# Patient Record
Sex: MALE | HISPANIC OR LATINO | Employment: FULL TIME | URBAN - METROPOLITAN AREA
[De-identification: names, ages, dates, MRNs, and addresses within clinical notes are randomized per-mention and may not be internally consistent; named-entity substitution may affect disease eponyms.]

---

## 2019-05-18 ENCOUNTER — APPOINTMENT (OUTPATIENT)
Dept: RADIOLOGY | Facility: MEDICAL CENTER | Age: 29
End: 2019-05-18
Attending: EMERGENCY MEDICINE
Payer: OTHER MISCELLANEOUS

## 2019-05-18 ENCOUNTER — HOSPITAL ENCOUNTER (EMERGENCY)
Facility: MEDICAL CENTER | Age: 29
End: 2019-05-19
Attending: EMERGENCY MEDICINE
Payer: OTHER MISCELLANEOUS

## 2019-05-18 DIAGNOSIS — N50.812 TESTICULAR PAIN, LEFT: ICD-10-CM

## 2019-05-18 DIAGNOSIS — R10.32 LEFT LOWER QUADRANT PAIN: ICD-10-CM

## 2019-05-18 DIAGNOSIS — N20.1 URETERAL STONE: ICD-10-CM

## 2019-05-18 LAB
ALBUMIN SERPL BCP-MCNC: 4.6 G/DL (ref 3.2–4.9)
ALBUMIN/GLOB SERPL: 1.6 G/DL
ALP SERPL-CCNC: 45 U/L (ref 30–99)
ALT SERPL-CCNC: 130 U/L (ref 2–50)
ANION GAP SERPL CALC-SCNC: 9 MMOL/L (ref 0–11.9)
APPEARANCE UR: CLEAR
AST SERPL-CCNC: 56 U/L (ref 12–45)
BACTERIA #/AREA URNS HPF: NEGATIVE /HPF
BASOPHILS # BLD AUTO: 0.9 % (ref 0–1.8)
BASOPHILS # BLD: 0.07 K/UL (ref 0–0.12)
BILIRUB SERPL-MCNC: 0.6 MG/DL (ref 0.1–1.5)
BILIRUB UR QL STRIP.AUTO: NEGATIVE
BUN SERPL-MCNC: 13 MG/DL (ref 8–22)
CALCIUM SERPL-MCNC: 9.3 MG/DL (ref 8.5–10.5)
CHLORIDE SERPL-SCNC: 105 MMOL/L (ref 96–112)
CO2 SERPL-SCNC: 24 MMOL/L (ref 20–33)
COLOR UR: YELLOW
CREAT SERPL-MCNC: 1.15 MG/DL (ref 0.5–1.4)
EOSINOPHIL # BLD AUTO: 0.26 K/UL (ref 0–0.51)
EOSINOPHIL NFR BLD: 3.3 % (ref 0–6.9)
EPI CELLS #/AREA URNS HPF: NEGATIVE /HPF
ERYTHROCYTE [DISTWIDTH] IN BLOOD BY AUTOMATED COUNT: 39.2 FL (ref 35.9–50)
GLOBULIN SER CALC-MCNC: 2.8 G/DL (ref 1.9–3.5)
GLUCOSE SERPL-MCNC: 106 MG/DL (ref 65–99)
GLUCOSE UR STRIP.AUTO-MCNC: NEGATIVE MG/DL
HCT VFR BLD AUTO: 51.8 % (ref 42–52)
HGB BLD-MCNC: 17.2 G/DL (ref 14–18)
HYALINE CASTS #/AREA URNS LPF: ABNORMAL /LPF
IMM GRANULOCYTES # BLD AUTO: 0.03 K/UL (ref 0–0.11)
IMM GRANULOCYTES NFR BLD AUTO: 0.4 % (ref 0–0.9)
KETONES UR STRIP.AUTO-MCNC: ABNORMAL MG/DL
LEUKOCYTE ESTERASE UR QL STRIP.AUTO: NEGATIVE
LIPASE SERPL-CCNC: 42 U/L (ref 11–82)
LYMPHOCYTES # BLD AUTO: 2.32 K/UL (ref 1–4.8)
LYMPHOCYTES NFR BLD: 29.8 % (ref 22–41)
MCH RBC QN AUTO: 29.3 PG (ref 27–33)
MCHC RBC AUTO-ENTMCNC: 33.2 G/DL (ref 33.7–35.3)
MCV RBC AUTO: 88.1 FL (ref 81.4–97.8)
MICRO URNS: ABNORMAL
MONOCYTES # BLD AUTO: 0.73 K/UL (ref 0–0.85)
MONOCYTES NFR BLD AUTO: 9.4 % (ref 0–13.4)
NEUTROPHILS # BLD AUTO: 4.38 K/UL (ref 1.82–7.42)
NEUTROPHILS NFR BLD: 56.2 % (ref 44–72)
NITRITE UR QL STRIP.AUTO: NEGATIVE
NRBC # BLD AUTO: 0 K/UL
NRBC BLD-RTO: 0 /100 WBC
PH UR STRIP.AUTO: 5 [PH]
PLATELET # BLD AUTO: 226 K/UL (ref 164–446)
PMV BLD AUTO: 11.6 FL (ref 9–12.9)
POTASSIUM SERPL-SCNC: 3.9 MMOL/L (ref 3.6–5.5)
PROT SERPL-MCNC: 7.4 G/DL (ref 6–8.2)
PROT UR QL STRIP: NEGATIVE MG/DL
RBC # BLD AUTO: 5.88 M/UL (ref 4.7–6.1)
RBC # URNS HPF: ABNORMAL /HPF
RBC UR QL AUTO: ABNORMAL
SODIUM SERPL-SCNC: 138 MMOL/L (ref 135–145)
SP GR UR STRIP.AUTO: 1.02
UROBILINOGEN UR STRIP.AUTO-MCNC: 0.2 MG/DL
WBC # BLD AUTO: 7.8 K/UL (ref 4.8–10.8)
WBC #/AREA URNS HPF: ABNORMAL /HPF

## 2019-05-18 PROCEDURE — 76870 US EXAM SCROTUM: CPT

## 2019-05-18 PROCEDURE — 81001 URINALYSIS AUTO W/SCOPE: CPT

## 2019-05-18 PROCEDURE — 80053 COMPREHEN METABOLIC PANEL: CPT

## 2019-05-18 PROCEDURE — 700102 HCHG RX REV CODE 250 W/ 637 OVERRIDE(OP): Performed by: EMERGENCY MEDICINE

## 2019-05-18 PROCEDURE — A9270 NON-COVERED ITEM OR SERVICE: HCPCS | Performed by: EMERGENCY MEDICINE

## 2019-05-18 PROCEDURE — 83690 ASSAY OF LIPASE: CPT

## 2019-05-18 PROCEDURE — 85025 COMPLETE CBC W/AUTO DIFF WBC: CPT

## 2019-05-18 PROCEDURE — 99284 EMERGENCY DEPT VISIT MOD MDM: CPT

## 2019-05-18 RX ORDER — IBUPROFEN 600 MG/1
600 TABLET ORAL ONCE
Status: COMPLETED | OUTPATIENT
Start: 2019-05-18 | End: 2019-05-18

## 2019-05-18 RX ADMIN — IBUPROFEN 600 MG: 600 TABLET ORAL at 22:24

## 2019-05-18 ASSESSMENT — PAIN DESCRIPTION - DESCRIPTORS
DESCRIPTORS: SHARP
DESCRIPTORS: SHARP

## 2019-05-19 ENCOUNTER — APPOINTMENT (OUTPATIENT)
Dept: RADIOLOGY | Facility: MEDICAL CENTER | Age: 29
End: 2019-05-19
Attending: EMERGENCY MEDICINE
Payer: OTHER MISCELLANEOUS

## 2019-05-19 VITALS
WEIGHT: 229.94 LBS | HEIGHT: 68 IN | TEMPERATURE: 98 F | HEART RATE: 94 BPM | RESPIRATION RATE: 18 BRPM | DIASTOLIC BLOOD PRESSURE: 72 MMHG | OXYGEN SATURATION: 91 % | SYSTOLIC BLOOD PRESSURE: 115 MMHG | BODY MASS INDEX: 34.85 KG/M2

## 2019-05-19 PROCEDURE — 74176 CT ABD & PELVIS W/O CONTRAST: CPT

## 2019-05-19 RX ORDER — HYDROCODONE BITARTRATE AND ACETAMINOPHEN 5; 325 MG/1; MG/1
1 TABLET ORAL EVERY 4 HOURS PRN
Qty: 9 TAB | Refills: 0 | Status: SHIPPED | OUTPATIENT
Start: 2019-05-19 | End: 2019-05-21

## 2019-05-19 RX ORDER — ONDANSETRON 4 MG/1
4 TABLET, FILM COATED ORAL EVERY 4 HOURS PRN
Qty: 10 TAB | Refills: 0 | Status: SHIPPED | OUTPATIENT
Start: 2019-05-19

## 2019-05-19 NOTE — DISCHARGE INSTRUCTIONS
Please follow-up with the urologist listed.  Call Monday morning schedule a follow-up appointment for complete recheck.  Please let the daughter know that you have a 4.6 mm proximal ureteral stone that may require follow-up.  Youmay take the medication as prescribed for pain.  Return to the emergency department if you develop new or worsening symptoms include worsening pain, fevers, pain with urination, worsening abdominal pain, or any further concerns.

## 2019-05-19 NOTE — ED PROVIDER NOTES
ED Provider Note    Chief Complaint:   Groin pain, abdominal pain    HPI:  Guerrero Elam is a 28 y.o. male who presents with chief complaint of groin pain and abdominal pain.  His symptoms began around 5:00 this afternoon.  Previously he was in his normal state of health.  He did take a short flight from [place] which he says often causes a little bit of nausea, otherwise he reports no nausea, no vomiting.  Around 5 PM he developed an acute onset severe pain localized to the left testicle.  It initially became more severe, then alleviated somewhat though did not resolve.  Pain then began to radiate to the lower abdomen, left more so than right.  He denies any associated fevers, no associated chest pain, he states he feels a little short of breath when the pain becomes very severe, but otherwise has no shortness of breath.  He denies any abnormal rashes or lesions, he has not noticed any testicular swelling.  Denies dysuria, denies hematuria.    He has a past medical history significant for headaches.  He has no history of diabetes, no impaired immunity, no endocrine disorder.    Review of Systems:  See HPI for pertinent positives and negatives. All other systems negative.    Past Medical History:   has a past medical history of Hypertension and Intractable chronic migraine without aura and without status migrainosus.    Social History:  Social History     Social History Main Topics   • Smoking status: Never Smoker   • Smokeless tobacco: Never Used   • Alcohol use 0.0 oz/week      Comment: rarely   • Drug use: No   • Sexual activity: Not on file       Surgical History:  patient denies any surgical history    Current Medications:  Home Medications    **Home medications have not yet been reviewed for this encounter**         Allergies:  Allergies   Allergen Reactions   • Latex    • Nkda [No Known Drug Allergy]        Physical Exam:  Vital Signs: /72   Pulse 94   Temp 36.7 °C (98 °F)   Resp 18    "Ht 1.727 m (5' 8\")   Wt 104.3 kg (229 lb 15 oz)   SpO2 91%   BMI 34.96 kg/m²   Constitutional: Alert, no acute distress  HENT: Moist mucus membranes, normal posterior pharynx, no intraoral lesions  Eyes: Pupils equal and reactive, normal conjunctiva  Neck: Supple, normal range of motion, no stridor  Cardiovascular: Extremities are warm and well perfused, no murmur appreciated, normal cardiac auscultation  Pulmonary: No respiratory distress, normal work of breathing, no accessory muscule usage, breath sounds clear and equal bilaterally  Abdomen: Soft, non-distended, mild discomfort on left lower quadrant palpation, no right lower quadrant tenderness to palpation, no peritoneal signs  : Normal external genitalia, bilateral testicles with normal lie, no testicular swelling, no scrotal edema, no evidence of abscess or cellulitis.  Left testicle is mildly tender to palpation.  Skin: Warm, dry, no rashes or lesions  Musculoskeletal: Normal range of motion in all extremities, no swelling or deformity noted  Neurologic: Alert, oriented, normal speech, normal motor function  Psychiatric: Normal and appropriate mood and affect    Medical records reviewed for continuity of care.  No recent medical records available for review.    Labs:  Labs Reviewed   CBC WITH DIFFERENTIAL - Abnormal; Notable for the following:        Result Value    MCHC 33.2 (*)     All other components within normal limits   COMP METABOLIC PANEL - Abnormal; Notable for the following:     Glucose 106 (*)     AST(SGOT) 56 (*)     ALT(SGPT) 130 (*)     All other components within normal limits   URINALYSIS,CULTURE IF INDICATED - Abnormal; Notable for the following:     Ketones Trace (*)     Occult Blood Large (*)     All other components within normal limits   URINE MICROSCOPIC (W/UA) - Abnormal; Notable for the following:     WBC 0-2 (*)     RBC  (*)     All other components within normal limits   LIPASE   ESTIMATED GFR       Radiology:  CT-RENAL " COLIC EVALUATION(A/P W/O)   Final Result         1.  4.6 mm proximal left ureteral stone results in mild left urinary outflow tract obstructive changes.   2.  Diverticulosis   3.  Hepatomegaly and hepatic steatosis.      [id number]   Final Result         1.  Small left hydrocele, otherwise normal scrotum ultrasound.         ED Medications Administered:  Medications   ibuprofen (MOTRIN) tablet 600 mg (600 mg Oral Given 5/18/19 2224)       Differential diagnosis:  Testicular torsion, urinary tract infection, ureteral stone, appendicitis    MDM:  History and physical exam as documented above.  Patient presents with pain originating in the left testicle radiating throughout the lower abdomen.  History is concerning for testicular torsion.    On laboratory evaluation urinalysis is ketone positive.  5100 red blood cells noted, no bacteria, no epithelial cells.  He does have slightly elevated AST and ALT, otherwise CMP is unremarkable.  He has a normal white blood count, normal hemoglobin, normal platelet count.    Scrotal ultrasound is within normal limits, vascular flow is symmetric.  Small left hydrocele noted.     CT abdomen pelvis ordered for renal stone evaluation due to occult hematuria.  This demonstrated a 4.6 mm proximal left ureteral stone with mild obstructive changes.  There is no evidence of urinary tract infection.    Patient's pain was treated with ibuprofen in the emergency department with significant improvement.  Plan at this time is for discharge home with a small amount of hydrocodone for breakthrough pain.  Counseled the patient that he may have continued episodes of pain as the stone is in the proximal ureter.  Additionally counseled him that it was at the upper limit of size that we can expect spontaneous passage.  For this reason he was given strict return precautions including worsening pain, development of fevers, dysuria, or any further concerns.  He was told to contact urology clinic  for an outpatient follow-up appointment in the event that urologic intervention is ultimately required.    In prescribing controlled substances to this patient, I certify that I have obtained and reviewed the medical history of Guerrero Elam. I have also made a good timmy effort to obtain applicable records from other providers who have treated the patient and records did not demonstrate any increased risk of substance abuse that would prevent me from prescribing controlled substances.     I have conducted a physical exam and documented it. I have reviewed Mr. Elam’s prescription history as maintained by the [place] Prescription Monitoring Program.     I have assessed the patient’s risk for abuse, dependency, and addiction using the validated Opioid Risk Tool available at https://www.mdcalc.com/xyuspu-dxgs-tafn-ort-narcotic-abuse.     Given the above, I believe the benefits of controlled substance therapy outweigh the risks. The reasons for prescribing controlled substances include non-narcotic, oral analgesic alternatives have been inadequate for pain control. Accordingly, I have discussed the risk and benefits, treatment plan, and alternative therapies with the patient.       Disposition:  Discharge home in stable condition    Final Impression:  1. Ureteral stone    2. Left lower quadrant pain    3. Testicular pain, left      Electronically signed by: Queenie Bush, 5/19/2019 4:08 AM

## 2019-05-19 NOTE — ED NOTES
Pain improved slightly after ibuprofen. Awaits u/s, denies further needs. rr even and unlabored. Call bell in reach.

## 2019-05-19 NOTE — ED TRIAGE NOTES
"Chief Complaint   Patient presents with   • Groin Pain     started in groin and migrated into abdomen.    • Abdominal Pain     Pt ambulatory to triage for above complaint. Pt appears uncomfortable. Pt flew home today and noticed pain starting in his testicular area and groin that then radiated to his abdomen. Pt denies pain in the groin and testicular area now and is only in his abdomen at this time. Pt denies n/v/d. Pt forced himself to vomit once to see if it relieved the pain. Regular BM last one yesterday. Pt tried to take pepto at home with no relief.   Pt educated on triage process and informed to notify staff of any changes or worsening.  /112   Pulse 91   Temp 36.3 °C (97.4 °F) (Temporal)   Resp 16   Ht 1.727 m (5' 8\")   Wt 104.3 kg (229 lb 15 oz)   SpO2 96%   BMI 34.96 kg/m²     "

## 2019-05-21 ENCOUNTER — ANESTHESIA EVENT (OUTPATIENT)
Dept: SURGERY | Facility: MEDICAL CENTER | Age: 29
End: 2019-05-21
Payer: OTHER MISCELLANEOUS

## 2019-05-22 ENCOUNTER — APPOINTMENT (OUTPATIENT)
Dept: RADIOLOGY | Facility: MEDICAL CENTER | Age: 29
End: 2019-05-22
Attending: UROLOGY
Payer: OTHER MISCELLANEOUS

## 2019-05-22 ENCOUNTER — ANESTHESIA (OUTPATIENT)
Dept: SURGERY | Facility: MEDICAL CENTER | Age: 29
End: 2019-05-22
Payer: OTHER MISCELLANEOUS

## 2019-05-22 ENCOUNTER — HOSPITAL ENCOUNTER (OUTPATIENT)
Facility: MEDICAL CENTER | Age: 29
End: 2019-05-22
Attending: UROLOGY | Admitting: UROLOGY
Payer: OTHER MISCELLANEOUS

## 2019-05-22 VITALS
WEIGHT: 225.09 LBS | HEIGHT: 68 IN | DIASTOLIC BLOOD PRESSURE: 102 MMHG | OXYGEN SATURATION: 95 % | BODY MASS INDEX: 34.11 KG/M2 | RESPIRATION RATE: 14 BRPM | SYSTOLIC BLOOD PRESSURE: 132 MMHG | HEART RATE: 86 BPM | TEMPERATURE: 97.6 F

## 2019-05-22 DIAGNOSIS — N20.1 LEFT URETERAL STONE: ICD-10-CM

## 2019-05-22 DIAGNOSIS — N35.912 STRICTURE OF BULBOUS URETHRA IN MALE, UNSPECIFIED STRICTURE TYPE: ICD-10-CM

## 2019-05-22 LAB — PATHOLOGY CONSULT NOTE: NORMAL

## 2019-05-22 PROCEDURE — 160046 HCHG PACU - 1ST 60 MINS PHASE II: Performed by: UROLOGY

## 2019-05-22 PROCEDURE — 700111 HCHG RX REV CODE 636 W/ 250 OVERRIDE (IP): Performed by: ANESTHESIOLOGY

## 2019-05-22 PROCEDURE — 500880 HCHG PACK, CYSTO W/SEP LEGGINGS: Performed by: UROLOGY

## 2019-05-22 PROCEDURE — 700105 HCHG RX REV CODE 258: Performed by: UROLOGY

## 2019-05-22 PROCEDURE — 700102 HCHG RX REV CODE 250 W/ 637 OVERRIDE(OP): Performed by: ANESTHESIOLOGY

## 2019-05-22 PROCEDURE — 501329 HCHG SET, CYSTO IRRIG Y TUR: Performed by: UROLOGY

## 2019-05-22 PROCEDURE — 160009 HCHG ANES TIME/MIN: Performed by: UROLOGY

## 2019-05-22 PROCEDURE — 160029 HCHG SURGERY MINUTES - 1ST 30 MINS LEVEL 4: Performed by: UROLOGY

## 2019-05-22 PROCEDURE — 160048 HCHG OR STATISTICAL LEVEL 1-5: Performed by: UROLOGY

## 2019-05-22 PROCEDURE — 160002 HCHG RECOVERY MINUTES (STAT): Performed by: UROLOGY

## 2019-05-22 PROCEDURE — C1769 GUIDE WIRE: HCPCS | Performed by: UROLOGY

## 2019-05-22 PROCEDURE — 700105 HCHG RX REV CODE 258: Performed by: ANESTHESIOLOGY

## 2019-05-22 PROCEDURE — 82365 CALCULUS SPECTROSCOPY: CPT

## 2019-05-22 PROCEDURE — 700101 HCHG RX REV CODE 250: Performed by: ANESTHESIOLOGY

## 2019-05-22 PROCEDURE — 160035 HCHG PACU - 1ST 60 MINS PHASE I: Performed by: UROLOGY

## 2019-05-22 PROCEDURE — 160036 HCHG PACU - EA ADDL 30 MINS PHASE I: Performed by: UROLOGY

## 2019-05-22 PROCEDURE — 700102 HCHG RX REV CODE 250 W/ 637 OVERRIDE(OP): Performed by: UROLOGY

## 2019-05-22 PROCEDURE — A9270 NON-COVERED ITEM OR SERVICE: HCPCS | Performed by: UROLOGY

## 2019-05-22 PROCEDURE — A9270 NON-COVERED ITEM OR SERVICE: HCPCS | Performed by: ANESTHESIOLOGY

## 2019-05-22 PROCEDURE — 160041 HCHG SURGERY MINUTES - EA ADDL 1 MIN LEVEL 4: Performed by: UROLOGY

## 2019-05-22 PROCEDURE — 700101 HCHG RX REV CODE 250: Performed by: UROLOGY

## 2019-05-22 PROCEDURE — 160025 RECOVERY II MINUTES (STATS): Performed by: UROLOGY

## 2019-05-22 PROCEDURE — 500042 HCHG BAG, URINARY DRAINAGE (CLOSED): Performed by: UROLOGY

## 2019-05-22 PROCEDURE — 500187 HCHG CATH, COUNCIL TIP 16FR: Performed by: UROLOGY

## 2019-05-22 PROCEDURE — 88300 SURGICAL PATH GROSS: CPT

## 2019-05-22 PROCEDURE — C2617 STENT, NON-COR, TEM W/O DEL: HCPCS | Performed by: UROLOGY

## 2019-05-22 PROCEDURE — 502240 HCHG MISC OR SUPPLY RC 0272: Performed by: UROLOGY

## 2019-05-22 DEVICE — STENT UROLOGICAL POLARIS 6X26  ULTRA: Type: IMPLANTABLE DEVICE | Site: URETER | Status: FUNCTIONAL

## 2019-05-22 RX ORDER — SODIUM CHLORIDE, SODIUM LACTATE, POTASSIUM CHLORIDE, CALCIUM CHLORIDE 600; 310; 30; 20 MG/100ML; MG/100ML; MG/100ML; MG/100ML
INJECTION, SOLUTION INTRAVENOUS CONTINUOUS
Status: DISCONTINUED | OUTPATIENT
Start: 2019-05-22 | End: 2019-05-22 | Stop reason: HOSPADM

## 2019-05-22 RX ORDER — ACETAMINOPHEN 325 MG/1
650 TABLET ORAL EVERY 4 HOURS PRN
COMMUNITY

## 2019-05-22 RX ORDER — DEXAMETHASONE SODIUM PHOSPHATE 4 MG/ML
INJECTION, SOLUTION INTRA-ARTICULAR; INTRALESIONAL; INTRAMUSCULAR; INTRAVENOUS; SOFT TISSUE PRN
Status: DISCONTINUED | OUTPATIENT
Start: 2019-05-22 | End: 2019-05-22 | Stop reason: SURG

## 2019-05-22 RX ORDER — CEFAZOLIN SODIUM 1 G/3ML
INJECTION, POWDER, FOR SOLUTION INTRAMUSCULAR; INTRAVENOUS PRN
Status: DISCONTINUED | OUTPATIENT
Start: 2019-05-22 | End: 2019-05-22 | Stop reason: SURG

## 2019-05-22 RX ORDER — MORPHINE SULFATE 10 MG/ML
5 INJECTION, SOLUTION INTRAMUSCULAR; INTRAVENOUS
Status: DISCONTINUED | OUTPATIENT
Start: 2019-05-22 | End: 2019-05-22 | Stop reason: HOSPADM

## 2019-05-22 RX ORDER — POLYETHYLENE GLYCOL 3350 17 G/17G
17 POWDER, FOR SOLUTION ORAL DAILY
Qty: 30 EACH | Refills: 0 | Status: SHIPPED | OUTPATIENT
Start: 2019-05-22

## 2019-05-22 RX ORDER — ONDANSETRON 2 MG/ML
4 INJECTION INTRAMUSCULAR; INTRAVENOUS
Status: DISCONTINUED | OUTPATIENT
Start: 2019-05-22 | End: 2019-05-22 | Stop reason: HOSPADM

## 2019-05-22 RX ORDER — MEPERIDINE HYDROCHLORIDE 25 MG/ML
6.25 INJECTION INTRAMUSCULAR; INTRAVENOUS; SUBCUTANEOUS
Status: DISCONTINUED | OUTPATIENT
Start: 2019-05-22 | End: 2019-05-22 | Stop reason: HOSPADM

## 2019-05-22 RX ORDER — DIPHENHYDRAMINE HYDROCHLORIDE 50 MG/ML
12.5 INJECTION INTRAMUSCULAR; INTRAVENOUS
Status: DISCONTINUED | OUTPATIENT
Start: 2019-05-22 | End: 2019-05-22 | Stop reason: HOSPADM

## 2019-05-22 RX ORDER — ONDANSETRON 2 MG/ML
INJECTION INTRAMUSCULAR; INTRAVENOUS PRN
Status: DISCONTINUED | OUTPATIENT
Start: 2019-05-22 | End: 2019-05-22 | Stop reason: SURG

## 2019-05-22 RX ORDER — OXYCODONE HCL 5 MG/5 ML
5 SOLUTION, ORAL ORAL
Status: COMPLETED | OUTPATIENT
Start: 2019-05-22 | End: 2019-05-22

## 2019-05-22 RX ORDER — HYDROCODONE BITARTRATE AND ACETAMINOPHEN 5; 325 MG/1; MG/1
1-2 TABLET ORAL EVERY 6 HOURS PRN
COMMUNITY

## 2019-05-22 RX ORDER — MIDAZOLAM HYDROCHLORIDE 1 MG/ML
1 INJECTION INTRAMUSCULAR; INTRAVENOUS
Status: DISCONTINUED | OUTPATIENT
Start: 2019-05-22 | End: 2019-05-22 | Stop reason: HOSPADM

## 2019-05-22 RX ORDER — OXYCODONE HCL 5 MG/5 ML
10 SOLUTION, ORAL ORAL
Status: COMPLETED | OUTPATIENT
Start: 2019-05-22 | End: 2019-05-22

## 2019-05-22 RX ORDER — MORPHINE SULFATE 4 MG/ML
1 INJECTION, SOLUTION INTRAMUSCULAR; INTRAVENOUS
Status: DISCONTINUED | OUTPATIENT
Start: 2019-05-22 | End: 2019-05-22 | Stop reason: HOSPADM

## 2019-05-22 RX ORDER — TAMSULOSIN HYDROCHLORIDE 0.4 MG/1
0.4 CAPSULE ORAL DAILY
Qty: 30 CAP | Refills: 1 | Status: SHIPPED | OUTPATIENT
Start: 2019-05-22

## 2019-05-22 RX ORDER — HALOPERIDOL 5 MG/ML
1 INJECTION INTRAMUSCULAR
Status: DISCONTINUED | OUTPATIENT
Start: 2019-05-22 | End: 2019-05-22 | Stop reason: HOSPADM

## 2019-05-22 RX ORDER — OXYBUTYNIN CHLORIDE 10 MG/1
10 TABLET, EXTENDED RELEASE ORAL
Qty: 14 TAB | Refills: 0 | Status: SHIPPED | OUTPATIENT
Start: 2019-05-22

## 2019-05-22 RX ORDER — OXYCODONE HYDROCHLORIDE 5 MG/1
5-10 TABLET ORAL EVERY 4 HOURS PRN
Qty: 25 TAB | Refills: 0 | Status: SHIPPED | OUTPATIENT
Start: 2019-05-22 | End: 2019-06-05

## 2019-05-22 RX ORDER — MORPHINE SULFATE 4 MG/ML
2 INJECTION, SOLUTION INTRAMUSCULAR; INTRAVENOUS
Status: DISCONTINUED | OUTPATIENT
Start: 2019-05-22 | End: 2019-05-22 | Stop reason: HOSPADM

## 2019-05-22 RX ORDER — PHENAZOPYRIDINE HYDROCHLORIDE 200 MG/1
200 TABLET, FILM COATED ORAL 3 TIMES DAILY PRN
Qty: 9 TAB | Refills: 0 | Status: SHIPPED | OUTPATIENT
Start: 2019-05-22 | End: 2019-05-25

## 2019-05-22 RX ORDER — KETOROLAC TROMETHAMINE 30 MG/ML
30 INJECTION, SOLUTION INTRAMUSCULAR; INTRAVENOUS ONCE
Status: COMPLETED | OUTPATIENT
Start: 2019-05-22 | End: 2019-05-22

## 2019-05-22 RX ADMIN — PROPOFOL 50 MG: 10 INJECTION, EMULSION INTRAVENOUS at 10:10

## 2019-05-22 RX ADMIN — ATROPA BELLADONNA AND OPIUM 30 MG: 16.2; 3 SUPPOSITORY RECTAL at 11:26

## 2019-05-22 RX ADMIN — PROPOFOL 100 MG: 10 INJECTION, EMULSION INTRAVENOUS at 09:54

## 2019-05-22 RX ADMIN — OXYCODONE HYDROCHLORIDE 5 MG: 5 SOLUTION ORAL at 11:22

## 2019-05-22 RX ADMIN — FENTANYL CITRATE 50 MCG: 50 INJECTION, SOLUTION INTRAMUSCULAR; INTRAVENOUS at 10:15

## 2019-05-22 RX ADMIN — SODIUM CHLORIDE, POTASSIUM CHLORIDE, SODIUM LACTATE AND CALCIUM CHLORIDE: 600; 310; 30; 20 INJECTION, SOLUTION INTRAVENOUS at 09:40

## 2019-05-22 RX ADMIN — SODIUM CHLORIDE, POTASSIUM CHLORIDE, SODIUM LACTATE AND CALCIUM CHLORIDE: 600; 310; 30; 20 INJECTION, SOLUTION INTRAVENOUS at 11:26

## 2019-05-22 RX ADMIN — FENTANYL CITRATE 50 MCG: 50 INJECTION, SOLUTION INTRAMUSCULAR; INTRAVENOUS at 09:45

## 2019-05-22 RX ADMIN — PROPOFOL 200 MG: 10 INJECTION, EMULSION INTRAVENOUS at 09:46

## 2019-05-22 RX ADMIN — SUCCINYLCHOLINE CHLORIDE 120 MG: 20 INJECTION, SOLUTION INTRAMUSCULAR; INTRAVENOUS at 10:10

## 2019-05-22 RX ADMIN — ONDANSETRON 4 MG: 2 INJECTION INTRAMUSCULAR; INTRAVENOUS at 10:40

## 2019-05-22 RX ADMIN — SODIUM CHLORIDE, POTASSIUM CHLORIDE, SODIUM LACTATE AND CALCIUM CHLORIDE: 600; 310; 30; 20 INJECTION, SOLUTION INTRAVENOUS at 07:56

## 2019-05-22 RX ADMIN — MIDAZOLAM HYDROCHLORIDE 2 MG: 1 INJECTION, SOLUTION INTRAMUSCULAR; INTRAVENOUS at 09:40

## 2019-05-22 RX ADMIN — CEFAZOLIN 2 G: 330 INJECTION, POWDER, FOR SOLUTION INTRAMUSCULAR; INTRAVENOUS at 09:47

## 2019-05-22 RX ADMIN — KETOROLAC TROMETHAMINE 30 MG: 30 INJECTION, SOLUTION INTRAMUSCULAR; INTRAVENOUS at 11:22

## 2019-05-22 RX ADMIN — DEXAMETHASONE SODIUM PHOSPHATE 10 MG: 4 INJECTION, SOLUTION INTRA-ARTICULAR; INTRALESIONAL; INTRAMUSCULAR; INTRAVENOUS; SOFT TISSUE at 09:49

## 2019-05-22 RX ADMIN — LIDOCAINE HYDROCHLORIDE 0.5 ML: 10 INJECTION, SOLUTION EPIDURAL; INFILTRATION; INTRACAUDAL at 07:56

## 2019-05-22 ASSESSMENT — PAIN SCALES - GENERAL: PAIN_LEVEL: 2

## 2019-05-22 NOTE — ANESTHESIA TIME REPORT
Anesthesia Start and Stop Event Times     Date Time Event    5/22/2019 0940 Anesthesia Start     1057 Anesthesia Stop        Responsible Staff  05/22/19    Name Role Begin End    Poly Joyce M.D. Anesth 0940 1057        Preop Diagnosis (Free Text):  Pre-op Diagnosis     LEFT URINARY CALCULUS         Preop Diagnosis (Codes):  Diagnosis Information     Diagnosis Code(s):         Post op Diagnosis  Ureteral stone      Premium Reason  Non-Premium    Comments:

## 2019-05-22 NOTE — PROGRESS NOTES
Med rec updated and complete  Allergies reviewed  Interviewed pt with mother at bedside with permission from pt.  Pt reports no vitamins  Pt reports no antibiotics in the last 30 days.

## 2019-05-22 NOTE — PROGRESS NOTES
"Received from recovery, pt AOx4, gaspar to DD with pink tinged urine, pt reports minimal pain 'just sore from the catheter\", tolerating PO  "

## 2019-05-22 NOTE — ANESTHESIA QCDR
2019 [facility] Clinical Data Registry (for Quality Improvement)     Postoperative nausea/vomiting risk protocol (Adult = 18 yrs and Pediatric 3-17 yrs)- (430 and 463)  General inhalation anesthetic (NOT TIVA) with PONV risk factors: Yes  Provision of anti-emetic therapy with at least 2 different classes of agents: Yes   Patient DID NOT receive anti-emetic therapy and reason is documented in Medical Record:  N/A    Multimodal Pain Management- (AQI59)  Patient undergoing Elective Surgery (i.e. Outpatient, or ASC, or Prescheduled Surgery prior to Hospital Admission): Yes  Use of Multimodal Pain Management, two or more drugs and/or interventions, NOT including systemic opioids: No   Exception: Documented allergy to multiple classes of analgesics:         PACU assessment of acute postoperative pain prior to Anesthesia Care End- Applies to Patients Age = 18- (ABG7)  Initial PACU pain score is which of the following: < 7/10  Patient unable to report pain score: N/A    Post-anesthetic transfer of care checklist/protocol to PACU/ICU- (426 and 427)  Upon conclusion of case, patient transferred to which of the following locations: PACU/Non-ICU  Use of transfer checklist/protocol: Yes  Exclusion: Service Performed in Patient Hospital Room (and thus did not require transfer): N/A    PACU Reintubation- (AQI31)  General anesthesia requiring endotracheal intubation (ETT) along with subsequent extubation in OR or PACU:   Required reintubation in the PACU:   Extubation was a planned trial documented in the medical record prior to removal of the original airway device:     Unplanned admission to ICU related to anesthesia service up through end of PACU care- (MD51)  Unplanned admission to ICU (not initially anticipated at anesthesia start time):

## 2019-05-22 NOTE — ANESTHESIA PROCEDURE NOTES
Airway  Date/Time: 5/22/2019 9:46 AM  Performed by: THOMAS GUZMÁN  Authorized by: THOMAS GUZMÁN     Location:  OR  Urgency:  Elective  Indications for Airway Management:  Anesthesia  Spontaneous Ventilation: absent    Sedation Level:  Deep  Preoxygenated: Yes    Mask Difficulty Assessment:  0 - not attempted  Final Airway Type:  Supraglottic airway  Final Supraglottic Airway:  Standard LMA  SGA Size:  5  Number of Attempts at Approach:  1

## 2019-05-22 NOTE — OR NURSING
Pt A&OX4. VSS. Pt on room air. Pt reports urge to void, reminded pt gaspar catheter in place. Gaspar output hazy and red upon arrival, clearing to light pink. 6/10 burning and soreness to penis and scrotum, pt also describes bladder spasms. B&O suppository administered per  order. Pain medication administered per anesthesia orders, pt reports pain tolerable at 3/10 and declined further pain medication. No complaints of nausea, tolerated sips of water. Script for oxycodone on chart. Miralax, Ditropan, Flomax, and Pyridium electronically sent to pt's pharmacy. Report called to REAL Hernandez. Pt out of PACU to Phase II via regan.

## 2019-05-22 NOTE — ANESTHESIA POSTPROCEDURE EVALUATION
Patient: Guerrero Solorio Vieira    Procedure Summary     Date:  05/22/19 Room / Location:  [place] [address] / SURGERY [facility]    Anesthesia Start:  0940 Anesthesia Stop:  1057    Procedures:       CYSTOSCOPY, WITH URETERAL STENT INSERTION (Left Bladder)      URETEROSCOPY (Left Ureter)      LITHOTRIPSY, USING LASER (Left Ureter)      URETHROTOMY - LASER DIRECT VISUAL INTERNAL URETHROTOMY (Urethra) Diagnosis:  (LEFT URINARY CALCULUS; URETHRAL STRICTURE)    Surgeon:  Fly Mac M.D. Responsible Provider:  Poly Joyce M.D.    Anesthesia Type:  general ASA Status:  2          Final Anesthesia Type: general  Last vitals  BP   Blood Pressure: 132/102, NIBP: 124/85    Temp   36.5 °C (97.7 °F)    Pulse   Pulse: 87, Heart Rate (Monitored): 85   Resp   14    SpO2   97 %      Anesthesia Post Evaluation    Patient location during evaluation: PACU  Patient participation: complete - patient participated  Level of consciousness: awake and alert  Pain score: 2    Airway patency: patent  Anesthetic complications: no  Cardiovascular status: hemodynamically stable  Respiratory status: acceptable  Hydration status: euvolemic    PONV: none           Nurse Pain Score: 2 (NPRS)

## 2019-05-22 NOTE — OP REPORT
Urologic Surgery Operative Report     Date of Procedure: 5/22/2019    Pre-op Diagnosis: 1. Left ureteral urinary stone  2. Left renal colic   Post-op Diagnosis: 1. Left ureteral urinary stone  2. Left renal colic  3. Short 2mm soft mid bulbar urethra stricture   Procedure: 1. Cystoscopy, Left Ureteroscopy w/ laser lithotripsy, JJ stent: 19888   2.  Direct vision internal urethrotomy, with laser   Surgeon Fly Mac MD   Assistant: None   Anesthesia: Anesthesiologist: Poly Joyce M.D.  General, LMA   Estimated Blood Loss: Minimal   IV fluids 1L Crystalloid   Specimens: 1. Left ureteralStone for chemical analysis    Complications: None   Condition: Stable, procedure well tolerated    Drains: 1. 6Fx26 cm JJ stent (OFF strings)  2. 18F [place] tip gaspar with 10cc in ballon   Disposition:  1. PACU, discharge after voiding  2. f/u Friday for gaspar catehter reomval  3. F/u 2 weeks for cysto stent removal her with me or in [facility] with urologist down there.    Findings 1. 0.5 cm stone at proximal ureter, pushed up into midpole calyx, fragmented and one small fragment taken for analysis.  2. 10F mid bulbar urethral stricture, soft, short 2mm, incised with laser at 12 oclock, and scope easily passed after this.       Indication:   Mr. Guerrero Solorio is a pleasant 20-year-old male who recently presented with severe left flank pain nausea and vomiting found on CT scan to have a 4 mm proximal left ureteral stone.  Despite a short trial of medical expose therapies continue to have significant symptoms and is moving to [place] next week.  We thus discussed planning a ureteroscopy..  After a full discussion of alternatives, risks, and benefits the patient consented to proceeding with Ureteroscopy, laser lithotripsy and possible JJ stent placement on the respective side.  He understands the risk of inability to access ureter, the need for second procedures, the possibility of negative ureteroscopy, that he may  have stent discomfort until this is removed, bleeding, infection, ureteral injury or stricture, bladder injury, post op urinary retention requiring gaspar catheter, and the general pulmonary and cardiovascular risks associated with anesthesia.     Procedure Details:   The patient was taken to operating room and placed on table in supine position.  Ancef was administered prior to the start of the procedure based on previous urine cultures. Sequential compression devices were placed for deep venous thrombosis prophylaxis. After induction of general anesthesia, both legs were placed in Scooby stirrups in the standard lithotomy position.  A timeout was held confirming the correct patient, procedure and laterality.   The perineal area was prepped and draped in a sterile fashion. A 22French rigid cystoscope was inserted into the urethra and I did note a soft 2 mm bulbar urethral stricture.  I did insert a wire past this and reinserted the scope with working bridge.  I used a 200 µm laser fiber to incise the stricture at 12:00 until the scope was easily passed up into the bladder.  The bladder was emptied and distended with normal saline.  I noted orthotopic ureteral orifices no mucosal abnormalities, and no stone within the bladder.    We passed a wire through the Left ureteral orifice into the respective renal pelvis which was visualized under fluoroscopic vision. The wire was moved to the side and a semirigid ureteroscope was placed into the urethra and passed up the ureter until it was hubbed at the penis, however there is no stone seen in the mid to distal ureter and I did believe this is been pushed up either into the kidney or stone the proximal ureter.  I then inserted the flexible ureteroscope up over a wire into the renal collecting system of the left side and fluoroscopic guidance.  The wire was removed and a complete pyeloscopy revealed a 4 to 5 mm stone fragment in the mid pole which was mildly difficult to  reach.  200 µm laser fiber was used to break this up into small pieces and dust.  I did passed out when these pieces which I removed and sent for chemical analysis.  Return to rigid cystoscope cystoscopy and I did not reinsert the wire up the left ureter into the left renal pelvis under fluoroscopic guidance.  I then inserted a 6 x 26 cm double-J stent with a good curl noted in the bladder and the proximal portion in the renal collecting system.  This point I did insert a wire through the scope into the bladder and the scope was removed.  I then placed an 18 [place] tip catheter over this wire in the bladder placed 10 cc in the balloon removing the wire was connected to a bag with clear reflux of urine.The patient was awoken from anesthesia and brought to recovery in satisfactory condition.        Fly Mac M.D.   [id number] [id number]   [phone]

## 2019-05-22 NOTE — DISCHARGE INSTRUCTIONS
Dr. Mac' Discharge Instructions FOLLOWING   Ureteroscopy and laser lithotripsy      DIET:  You can resume your regular diet. We encourage you to eat well-balanced and nutritious meals.      ACTIVITY:  Please restrain from strenuous activity or heavy lifting (more than 10 pounds) for the next week.  Please walk daily as much as tolerated, making exercise a part of your daily life. Do not drive while using narcotics for pain control.     WOUND CARE:  1. You have no dressing or open wounds to manage.   2. You have an internal ureteral stent OFF strings. We will need to remove this stent in in 2 weeks via a simple office procedure.    CATHETER CARE:  1. You have a gaspar catheter in place, and you should care for it as instructed by nurse.  This should be removed in 48 hours using a syringe to deflate the catheter balloon as instructed by the RN in the PACU.     MEDICATIONS:  1. Please use over the counter Tylenol or Ibuprofen for pain control as needed  2. You may take 3 days of pyridium as prescribed for numbing the bladder and burning with urination.  3a. You have been prescribed oxybutynin prn to help with bladder spasms or burning with urination. Please hold this if having difficulty urinating however.   3b. If you have severe pain refractory to Ibuprofen you may use Oxycodone for pain control as well as prescribed.   3c. Please use flomax daily as prescribed while you have a stent in place to lessen stone pain.   4. If you are using aspirin, Plavix, or coumadin, please don’t restart these medications until two days after your discharge if you are not having large amounts of blood in the urine.    FOLLOW-UP:  We will call you to schedule your follow up appointment in 2 days for gaspar cathete removal and in about 2 weeks for cystoscopy and stent removal.  The stent removal alternatively could be done down in [place] if you prefer. If you have not heard from us in 1-2 business days, please call [phone] to  schedule your follow-up appointment. You may also contact this number if you have questions or concerns that can be answered by Dr. Mac’ staff.      WARNING SIGNS:  Fever greater than 101 degrees Fahrenheit, chills, nausea or vomiting, Large amount of clots in urine that make it difficult to urinate or for urine to drain from gaspar, increasing pain, or abdominal swelling. If you are experiencing these symptoms, call the Urology Clinic or go to your local PCP or emergency room.    It is normal to see blood in your urine for up to 2 weeks even from surgery. The urine may clear up entirely, and then turn bloody again a few days later depending on your activity level; do not be alarmed. However, if you experience severe pain or tenderness, have a lot of increased bleeding, or find that you are unable to urinate because of large clots, please notify your doctor immediately     MEDICAL HELP DURING NORMAL BUSINESS HOURS  Between the hours of 7 AM and 7 PM, please call [phone] to speak with Dr. Fly Mac’ staff.     MEDICAL HELP AFTER HOURS  If you have a serious emergency such as chest pain, shortness of breath, relentless pain you should call 911. For other urgent problems after hours you may contact the urology physician on call by phoning the [phone]. You may also visit the Emergency room at local hospital for help.     For non-emergent problems such as prescription refills or routine questions, please do your best to contact us during normal business hours. This after-hours number should be used for urgent or emergent questions only.       Fly Mac M.D.   [address] [place] [address]   [phone]             ACTIVITY: Rest and take it easy for the first 24 hours.  A responsible adult is recommended to remain with you during that time.  It is normal to feel sleepy.  We encourage you to not do anything that requires balance, judgment or coordination.    MILD FLU-LIKE SYMPTOMS ARE NORMAL.  YOU MAY EXPERIENCE GENERALIZED MUSCLE ACHES, THROAT IRRITATION, HEADACHE AND/OR SOME NAUSEA.    FOR 24 HOURS DO NOT:  Drive, operate machinery or run household appliances.  Drink beer or alcoholic beverages.   Make important decisions or sign legal documents.        DIET: To avoid nausea, slowly advance diet as tolerated, avoiding spicy or greasy foods for the first day.  Add more substantial food to your diet according to your physician's instructions.    INCREASE FLUIDS AND FIBER TO AVOID CONSTIPATION.          You should CALL YOUR PHYSICIAN if you develop:  Fever greater than 101 degrees F.  Pain not relieved by medication, or persistent nausea or vomiting.  Excessive bleeding (blood soaking through dressing) or unexpected drainage from the wound.  Extreme redness or swelling around the incision site, drainage of pus or foul smelling drainage.  Inability to urinate or empty your bladder within 8 hours.  Problems with breathing or chest pain.    You should call 911 if you develop problems with breathing or chest pain.      If any questions arise, call your doctor.  If your doctor is not available, please feel free to call the Surgical Center at [phone].  The Center is open Monday through Friday from 7AM to 7PM.  You can also call the [employer] HOTLINE open 24 hours/day, 7 days/week and speak to a nurse at [phone], or toll free at [phone].    A registered nurse may call you a few days after your surgery to see how you are doing after your procedure.    MEDICATIONS: Resume taking daily medication.  Take prescribed pain medication with food.  If no medication is prescribed, you may take non-aspirin pain medication if needed.  PAIN MEDICATION CAN BE VERY CONSTIPATING.  Take a stool softener or laxative such as senokot, pericolace, or milk of magnesia if needed.    Prescription given for oxycodone.  Last pain medication given at 11:22    If your physician has prescribed pain medication that includes  Acetaminophen (Tylenol), do not take additional Acetaminophen (Tylenol) while taking the prescribed medication.    Depression / Suicide Risk    As you are discharged from this [facility] facility, it is important to learn how to keep safe from harming yourself.    Recognize the warning signs:  · Abrupt changes in personality, positive or negative- including increase in energy   · Giving away possessions  · Change in eating patterns- significant weight changes-  positive or negative  · Change in sleeping patterns- unable to sleep or sleeping all the time   · Unwillingness or inability to communicate  · Depression  · Unusual sadness, discouragement and loneliness  · Talk of wanting to die  · Neglect of personal appearance   · Rebelliousness- reckless behavior  · Withdrawal from people/activities they love  · Confusion- inability to concentrate     If you or a loved one observes any of these behaviors or has concerns about self-harm, here's what you can do:  · Talk about it- your feelings and reasons for harming yourself  · Remove any means that you might use to hurt yourself (examples: pills, rope, extension cords, firearm)  · Get professional help from the community (Mental Health, Substance Abuse, psychological counseling)  · Do not be alone:Call your Safe Contact- someone whom you trust who will be there for you.  · Call your local CRISIS HOTLINE 021-4341 or 031-542-9849  · Call your local Children's Mobile Crisis Response Team [place] (675) 938-5406 or www.[employer]  · Call the toll free National Suicide Prevention Hotlines   · National Suicide Prevention Lifeline 388-228-YRGI (6650)  · National Hope Line Network 800-SUICIDE (737-5690)

## 2019-05-22 NOTE — ANESTHESIA PREPROCEDURE EVALUATION
"27 yo pt has h/o HTN but no meds.    Relevant Problems   No relevant active problems       Physical Exam    Airway   Mallampati: II  TM distance: >3 FB  Neck ROM: full       Cardiovascular - normal exam  Rhythm: regular  Rate: normal  (-) murmur     Dental - normal exam         Pulmonary - normal exam  Breath sounds clear to auscultation     Abdominal    Neurological - normal exam         Other findings: Pt states his teeth are \"loose\" but not coming out.          Pt states he is somewhat resistant to anesthesia (local, sedation). Has never had GA. No family h/o anes problems.   Anesthesia Plan    ASA 2       Plan - general       Airway plan will be LMA        Induction: intravenous    Postoperative Plan: Postoperative administration of opioids is intended.    Pertinent diagnostic labs and testing reviewed    Informed Consent:    Anesthetic plan and risks discussed with patient.    Use of blood products discussed with: patient whom consented to blood products.         "

## 2019-05-22 NOTE — PROGRESS NOTES
Pt ao x4, amb with steady gait., gaspar care instructions reviewed and written instructions provided, instructions given on leg bag and leg bag connected prior to pt dressing. Further instructions reviewed with patient and mother, all questions answered, reviewed instructions on gaspar removal in 2 days and pt states more comfortable going to clinic to have gaspar removed then doing it at home.  Discharged to home

## 2019-05-26 LAB
APPEARANCE STONE: NORMAL
COMPN STONE: NORMAL
NUMBER STONE: 1
SIZE STONE: NORMAL MM
SPECIMEN WT: 8 MG

## 2024-09-09 ENCOUNTER — OFFICE VISIT (OUTPATIENT)
Dept: URGENT CARE | Facility: PHYSICIAN GROUP | Age: 34
End: 2024-09-09
Payer: COMMERCIAL

## 2024-09-09 VITALS
SYSTOLIC BLOOD PRESSURE: 120 MMHG | RESPIRATION RATE: 16 BRPM | OXYGEN SATURATION: 97 % | BODY MASS INDEX: 32.58 KG/M2 | HEIGHT: 68 IN | HEART RATE: 86 BPM | DIASTOLIC BLOOD PRESSURE: 74 MMHG | WEIGHT: 215 LBS | TEMPERATURE: 97.5 F

## 2024-09-09 DIAGNOSIS — M54.50 ACUTE RIGHT-SIDED LOW BACK PAIN WITHOUT SCIATICA: ICD-10-CM

## 2024-09-09 LAB
APPEARANCE UR: CLEAR
BILIRUB UR STRIP-MCNC: NEGATIVE MG/DL
COLOR UR AUTO: YELLOW
GLUCOSE UR STRIP.AUTO-MCNC: NEGATIVE MG/DL
KETONES UR STRIP.AUTO-MCNC: NEGATIVE MG/DL
LEUKOCYTE ESTERASE UR QL STRIP.AUTO: NEGATIVE
NITRITE UR QL STRIP.AUTO: NEGATIVE
PH UR STRIP.AUTO: 5.5 [PH] (ref 5–8)
PROT UR QL STRIP: NEGATIVE MG/DL
RBC UR QL AUTO: NEGATIVE
SP GR UR STRIP.AUTO: 1.02
UROBILINOGEN UR STRIP-MCNC: 0.2 MG/DL

## 2024-09-09 PROCEDURE — 81002 URINALYSIS NONAUTO W/O SCOPE: CPT | Performed by: PHYSICIAN ASSISTANT

## 2024-09-09 PROCEDURE — 3074F SYST BP LT 130 MM HG: CPT | Performed by: PHYSICIAN ASSISTANT

## 2024-09-09 PROCEDURE — 99203 OFFICE O/P NEW LOW 30 MIN: CPT | Performed by: PHYSICIAN ASSISTANT

## 2024-09-09 PROCEDURE — 3078F DIAST BP <80 MM HG: CPT | Performed by: PHYSICIAN ASSISTANT

## 2024-09-09 ASSESSMENT — ENCOUNTER SYMPTOMS
NAUSEA: 1
EYE REDNESS: 0
EYE DISCHARGE: 0
BACK PAIN: 1
PERIANAL NUMBNESS: 0
VOMITING: 0
TINGLING: 0
BOWEL INCONTINENCE: 0
NUMBNESS: 0
FEVER: 0
LEG PAIN: 0
WEAKNESS: 0
PARESTHESIAS: 0

## 2024-09-09 NOTE — PROGRESS NOTES
Subjective     Guerrero Vieira is a 34 y.o. male who presents with Low Back Pain (X last couple of days and states he had kidney stones in the past and is concern that that's what it may be. )            Back Pain  This is a new problem. Episode onset: x 1 week ago. The problem occurs constantly. The problem has been waxing and waning since onset. The pain is present in the lumbar spine. The quality of the pain is described as aching. The pain does not radiate. The pain is moderate. Pertinent negatives include no bladder incontinence, bowel incontinence, dysuria, fever, leg pain, numbness, paresthesias, perianal numbness, tingling or weakness. Treatments tried: OTC Tylenol.     The patient reports no recent injury or trauma to his low back.  The patient states he does have a history of kidney stones, and is concerned his low back pain may be related to a kidney stone.  The patient is currently not experiencing any UTI-like symptoms.  No dysuria.  No hematuria.  The patient states last week he had a brief episode of right testicular pain, which lasted only seconds.  The patient is currently not experiencing any testicular pain or swelling.  He reports no abdominal pain.    PMH:  has a past medical history of Hypertension and Intractable chronic migraine without aura and without status migrainosus.  MEDS:   Current Outpatient Medications:     HYDROcodone-acetaminophen (NORCO) 5-325 MG Tab per tablet, Take 1-2 Tabs by mouth every 6 hours as needed. (Patient not taking: Reported on 9/9/2024), Disp: , Rfl:     acetaminophen (TYLENOL) 325 MG Tab, Take 650 mg by mouth every four hours as needed for Moderate Pain. (Patient not taking: Reported on 9/9/2024), Disp: , Rfl:     DimenhyDRINATE (DRAMAMINE PO), Take 1 Tab by mouth PRN (For nausous). (Patient not taking: Reported on 9/9/2024), Disp: , Rfl:     oxybutynin SR (DITROPAN-XL) 10 MG CR tablet, Take 1 Tab by mouth 1 time daily as needed (bladder spasms/stent  pain.). For stent pain/bladder spasms. Stop if having difficulty peeing. (Patient not taking: Reported on 9/9/2024), Disp: 14 Tab, Rfl: 0    tamsulosin (FLOMAX) 0.4 MG capsule, Take 1 Cap by mouth every day. For relief from stent or ureteral pain. (Patient not taking: Reported on 9/9/2024), Disp: 30 Cap, Rfl: 1    polyethylene glycol/lytes (MIRALAX) Pack, Take 1 Packet by mouth every day. Please take to prevent constipation following your procedure.  If having loose stools this can be held. (Patient not taking: Reported on 9/9/2024), Disp: 30 Each, Rfl: 0    ondansetron (ZOFRAN) 4 MG Tab tablet, Take 1 Tab by mouth every four hours as needed for Nausea/Vomiting. (Patient not taking: Reported on 9/9/2024), Disp: 10 Tab, Rfl: 0  ALLERGIES:   Allergies   Allergen Reactions    Latex Rash     .     SURGHX:   Past Surgical History:   Procedure Laterality Date    PB CYSTOSCOPY,INSERT URETERAL STENT Left 5/22/2019    Procedure: CYSTOSCOPY, WITH URETERAL STENT INSERTION;  Surgeon: Fly Mac M.D.;  Location: [facility];  Service: Urology    URETEROSCOPY Left 5/22/2019    Procedure: URETEROSCOPY;  Surgeon: lFy Mac M.D.;  Location: [facility];  Service: Urology    LASERTRIPSY Left 5/22/2019    Procedure: LITHOTRIPSY, USING LASER;  Surgeon: Fly Mac M.D.;  Location: [facility];  Service: Urology    URETHROTOMY  5/22/2019    Procedure: URETHROTOMY - LASER DIRECT VISUAL INTERNAL URETHROTOMY;  Surgeon: Fly Mac M.D.;  Location: [facility];  Service: Urology     SOCHX:  reports that he has never smoked. He has never used smokeless tobacco. He reports current alcohol use. He reports that he does not use drugs.  FH: Family history was reviewed, no pertinent findings to report      Review of Systems   Constitutional:  Negative for fever.   Eyes:  Negative for discharge and redness.   Gastrointestinal:  Positive for nausea. Negative for bowel  "incontinence and vomiting.   Genitourinary:  Negative for bladder incontinence, dysuria, frequency, hematuria and urgency.   Musculoskeletal:  Positive for back pain.   Neurological:  Negative for tingling, weakness, numbness and paresthesias.              Objective     /74 (BP Location: Right arm, Patient Position: Sitting, BP Cuff Size: Adult long)   Pulse 86   Temp 36.4 °C (97.5 °F) (Temporal)   Resp 16   Ht 1.727 m (5' 8\")   Wt 97.5 kg (215 lb)   SpO2 97%   BMI 32.69 kg/m²      Physical Exam  Constitutional:       General: He is not in acute distress.     Appearance: Normal appearance. He is well-developed. He is not ill-appearing.   HENT:      Head: Normocephalic and atraumatic.      Right Ear: External ear normal.      Left Ear: External ear normal.   Eyes:      Extraocular Movements: Extraocular movements intact.      Conjunctiva/sclera: Conjunctivae normal.   Cardiovascular:      Rate and Rhythm: Normal rate and regular rhythm.      Heart sounds: Normal heart sounds.   Pulmonary:      Effort: Pulmonary effort is normal. No respiratory distress.      Breath sounds: Normal breath sounds. No wheezing.   Abdominal:      Palpations: Abdomen is soft.      Tenderness: There is no abdominal tenderness. There is no right CVA tenderness or left CVA tenderness.   Musculoskeletal:      Cervical back: Normal range of motion and neck supple.      Comments:   Lumbar Spine:  Tenderness to the right paraspinal muscle region of the lumbar spine.  No midline/bony tenderness.  No palpable step-off.  No edema.  No ecchymosis.  No overlying erythema.  No increased warmth.  No rashes/lesions.  ROM intact -the patient demonstrates full active range of motion  Neurovascular intact distally  Strength 5/5 -equal bilateral lower extremities  Normal gait   Skin:     General: Skin is warm and dry.   Neurological:      Mental Status: He is alert and oriented to person, place, and time.               Progress:  Results for " orders placed or performed in visit on 09/09/24   POCT Urinalysis   Result Value Ref Range    POC Color Yellow Negative    POC Appearance Clear Negative    POC Glucose Negative Negative mg/dL    POC Bilirubin Negative Negative mg/dL    POC Ketones Negative Negative mg/dL    POC Specific Gravity 1.025 <1.005 - >1.030    POC Blood Negative Negative    POC Urine PH 5.5 5.0 - 8.0    POC Protein Negative Negative mg/dL    POC Urobiligen 0.2 Negative (0.2) mg/dL    POC Nitrites Negative Negative    POC Leukocyte Esterase Negative Negative                  Assessment & Plan        Assessment & Plan  Right low back pain, unspecified chronicity, unspecified whether sciatica present    Orders:    POCT Urinalysis          The patient's presenting symptoms and physical exam findings are consistent with right-sided low back pain.  On physical exam, the patient had tenderness to the right paraspinal muscle region of the lumbar spine without midline/bony tenderness or palpable step-off.  No CVA tenderness was appreciated.  The patient is nontoxic and appears in no acute distress.  The patient's vital signs are stable and within normal limits.  He is afebrile today in clinic.  The patient's POCT urinalysis today in clinic was normal with negative leukocyte esterase, negative blood, and negative nitrates.  Based on the patient's presenting symptoms and physical exam findings, I have low clinical suspicion for acute nephrolithiasis. The patient's low back pain is likely musculoskeletal in nature.  I offered the patient a prescription for Flexeril today in clinic, which he politely declined.  Advised the patient to monitor worsening signs or symptoms.  Recommend OTC medications and supportive care for symptomatic management.  Recommend patient follow-up with primary care as needed.  Discussed return precautions with the patient, and he verbalized understanding.    Differential diagnoses, supportive care, and indications for immediate  follow-up discussed with patient.   Instructed to return to clinic or nearest emergency department for any change in condition, further concerns, or worsening of symptoms.    OTC NSAIDs for pain/discomfort  Alternate ice and heat to the affected area for symptomatic relief  Home stretches as discussed in clinic  Follow-up with PCP  Return to clinic or go to the ED if symptoms worsen or fail to improve, or if the patient should develop worsening/increasing back pain, radiation of pain, numbness, tingling, or weakness to the lower extremities, incontinence of bladder/bowel, paresthesias, difficulty walking, fever/chills, and/or any concerning symptoms.     Discussed plan with the patient, and he agrees to the above.    I personally reviewed prior external notes and test results pertinent to today's visit.  I have independently reviewed and interpreted all diagnostics ordered during this urgent care visit.     Please note that this dictation was created using voice recognition software. I have made every reasonable attempt to correct obvious errors, but I expect that there may be errors of grammar and possibly content that I did not discover before finalizing the note.     This note was electronically signed by Miya Morataya PA-C

## (undated) DEVICE — GLOVE BIOGEL SZ 7.5 SURGICAL PF LTX - (50PR/BX 4BX/CA)

## (undated) DEVICE — PACK CYSTOSCOPY - (14/CA)

## (undated) DEVICE — TUBE CONNECT SUCTION CLEAR 120 X 1/4" (50EA/CA)"

## (undated) DEVICE — TUBE E-T HI-LO CUFF 7.5MM (10EA/PK)

## (undated) DEVICE — PROTECTOR ULNA NERVE - (36PR/CA)

## (undated) DEVICE — SET LEADWIRE 5 LEAD BEDSIDE DISPOSABLE ECG (1SET OF 5/EA)

## (undated) DEVICE — BASKET ZERO TIP

## (undated) DEVICE — SPONGE GAUZESTER 4 X 4 4PLY - (128PK/CA)

## (undated) DEVICE — GOWN SURGICAL X-LARGE ULTRA - FILM-REINFORCED (20/CA)

## (undated) DEVICE — MASK, LARYNGEAL AIRWAY #4

## (undated) DEVICE — SET IRRIGATION CYSTOSCOPY Y-TYPE L81 IN (20EA/CA)

## (undated) DEVICE — DILATOR NOTTINGHAM 6F-10FRX70CM

## (undated) DEVICE — LASER FIBER HOLM 365 MICRON 100 WATT (5EA/BX)

## (undated) DEVICE — LACTATED RINGERS INJ 1000 ML - (14EA/CA 60CA/PF)

## (undated) DEVICE — LASER TRAC TIP 200 MIRCON FOR 100 WATT LASER

## (undated) DEVICE — WIRE GUIDE SENSOR DUAL FLEX - 5/BX

## (undated) DEVICE — CATH, COUNCIL TIP 16 FR

## (undated) DEVICE — ELECTRODE 850 FOAM ADHESIVE - HYDROGEL RADIOTRNSPRNT (50/PK)

## (undated) DEVICE — COVER FOOT UNIVERSAL DISP. - (25EA/CA)

## (undated) DEVICE — MASK ANESTHESIA ADULT  - (100/CA)

## (undated) DEVICE — SYRINGE DISP. 12 CC LL - (100/BX)

## (undated) DEVICE — NEPTUNE 4 PORT MANIFOLD - (20/PK)

## (undated) DEVICE — GLOVE BIOGEL PI INDICATOR SZ 7.5 SURGICAL PF LF -(50/BX 4BX/CA)

## (undated) DEVICE — CONTAINER SPECIMEN BAG OR - STERILE 4 OZ W/LID (100EA/CA)

## (undated) DEVICE — SET EXTENSION WITH 2 PORTS (48EA/CA) ***PART #2C8610 IS A SUBSTITUTE*****

## (undated) DEVICE — KIT ROOM DECONTAMINATION

## (undated) DEVICE — SENSOR SPO2 NEO LNCS ADHESIVE (20/BX) SEE USER NOTES

## (undated) DEVICE — SODIUM CHL. IRRIGATION 0.9% 3000ML (4EA/CA 65CA/PF)

## (undated) DEVICE — CONNECTOR HOSE NEPTUNE FOR CYSTO ROOM

## (undated) DEVICE — WATER IRRIG. STER 3000 ML - (4/CA)

## (undated) DEVICE — KIT ANESTHESIA W/CIRCUIT & 3/LT BAG W/FILTER (20EA/CA)

## (undated) DEVICE — GOWN WARMING STANDARD FLEX - (30/CA)

## (undated) DEVICE — TUBING CLEARLINK DUO-VENT - C-FLO (48EA/CA)

## (undated) DEVICE — HEAD HOLDER JUNIOR/ADULT

## (undated) DEVICE — JELLY, KY 2 0Z STERILE

## (undated) DEVICE — GLOVE BIOGEL PI INDICATOR SZ 7.0 SURGICAL PF LF - (50/BX 4BX/CA)

## (undated) DEVICE — BAG DRAINAGE URINARY CLOSED 2000ML (20EA/CA)

## (undated) DEVICE — SUCTION INSTRUMENT YANKAUER BULBOUS TIP W/O VENT (50EA/CA)

## (undated) DEVICE — TOWELS CLOTH SURGICAL - (4/PK 20PK/CA)

## (undated) DEVICE — DETERGENT RENUZYME PLUS 10 OZ PACKET (50/BX)

## (undated) DEVICE — WATER IRRIG. STER. 1500 ML - (9/CA)